# Patient Record
Sex: MALE | Race: WHITE | NOT HISPANIC OR LATINO | Employment: OTHER | URBAN - METROPOLITAN AREA
[De-identification: names, ages, dates, MRNs, and addresses within clinical notes are randomized per-mention and may not be internally consistent; named-entity substitution may affect disease eponyms.]

---

## 2017-01-23 ENCOUNTER — OFFICE VISIT (OUTPATIENT)
Dept: NEUROLOGY | Facility: CLINIC | Age: 82
End: 2017-01-23

## 2017-01-23 VITALS
HEIGHT: 67 IN | SYSTOLIC BLOOD PRESSURE: 117 MMHG | DIASTOLIC BLOOD PRESSURE: 58 MMHG | OXYGEN SATURATION: 98 % | WEIGHT: 169 LBS | HEART RATE: 70 BPM | BODY MASS INDEX: 26.53 KG/M2

## 2017-01-23 DIAGNOSIS — R51.9 CHRONIC INTRACTABLE HEADACHE, UNSPECIFIED HEADACHE TYPE: ICD-10-CM

## 2017-01-23 DIAGNOSIS — R41.3 MEMORY LOSS: ICD-10-CM

## 2017-01-23 DIAGNOSIS — G89.29 CHRONIC INTRACTABLE HEADACHE, UNSPECIFIED HEADACHE TYPE: ICD-10-CM

## 2017-01-23 DIAGNOSIS — G20 PARKINSON'S DISEASE (HCC): Primary | ICD-10-CM

## 2017-01-23 PROCEDURE — 99214 OFFICE O/P EST MOD 30 MIN: CPT | Performed by: NURSE PRACTITIONER

## 2017-01-23 RX ORDER — CLONIDINE HYDROCHLORIDE 0.2 MG/1
0.2 TABLET ORAL 2 TIMES DAILY
COMMUNITY
End: 2017-02-03

## 2017-01-23 RX ORDER — CLOPIDOGREL BISULFATE 75 MG/1
75 TABLET ORAL DAILY
COMMUNITY

## 2017-01-23 RX ORDER — GABAPENTIN 100 MG/1
300 CAPSULE ORAL 3 TIMES DAILY
Qty: 90 CAPSULE | Refills: 0 | Status: SHIPPED | OUTPATIENT
Start: 2017-01-23 | End: 2018-01-23

## 2017-01-23 NOTE — PROGRESS NOTES
Subjective:     Patient ID: Reid Pratt is a 89 y.o. male presenting for follow up for headaches, memory loss, and Parkinson's Disease. The patient was initialy seen by Dr. Laws on November 22, 2016. At htat time he was taking donepezil 5 mg daily for his memory loss. Dr. Laws had him increase the medication to 10 mg daily. His daughter is with him today and states he is tolearting the medication well. She feels his memory is about the same.     His headaches have worsened since his last visit. He was prescribed gabapentin 100 mg three times daily by Dr. Laws with instructions to titrate up to 300 mg three times daily if needed. The patient has not increased the dose and his daughter states they were unaware that they could do so. He is tolerating the medication well. He has complaints of pain and tenderness in the right occipital area at the skull base but no triggering of shooting pains to the crown.     He has continued to have frequent falls, about two per month. He has trouble with balance. He also has a tremor to his jaw and a pill rolling tremor to his left greater than right hand.     Headache    Associated symptoms include back pain, dizziness, hearing loss, neck pain and numbness (hands). Pertinent negatives include no abdominal pain, eye pain, nausea, photophobia, seizures, vomiting or weakness.   Parkinson's Disease   Associated symptoms include headaches, neck pain and numbness (hands). Pertinent negatives include no abdominal pain, chest pain, fatigue, nausea, rash, vomiting or weakness.     The following portions of the patient's history were reviewed and updated as appropriate: allergies, current medications, past family history, past medical history, past social history, past surgical history and problem list.    Review of Systems   Constitutional: Positive for activity change (eating less). Negative for appetite change and fatigue.   HENT: Positive for hearing loss. Negative for facial  swelling and trouble swallowing.    Eyes: Negative for photophobia, pain and visual disturbance.   Respiratory: Negative for choking, chest tightness and shortness of breath.    Cardiovascular: Negative for chest pain, palpitations and leg swelling.   Gastrointestinal: Negative for abdominal pain, nausea and vomiting.   Endocrine: Negative for polydipsia and polyphagia.   Genitourinary: Positive for difficulty urinating. Negative for frequency and urgency.   Musculoskeletal: Positive for back pain, gait problem and neck pain.   Skin: Negative for color change, rash and wound.   Allergic/Immunologic: Negative for environmental allergies, food allergies and immunocompromised state.   Neurological: Positive for dizziness, numbness (hands) and headaches. Negative for tremors, seizures, syncope, facial asymmetry, speech difficulty, weakness and light-headedness.   Hematological: Negative for adenopathy. Bruises/bleeds easily.   Psychiatric/Behavioral: Positive for confusion and decreased concentration. Negative for agitation, behavioral problems, dysphoric mood, hallucinations, self-injury, sleep disturbance and suicidal ideas. The patient is not nervous/anxious and is not hyperactive.         Objective:    Neurologic Exam  General appearance: Well-developed elderly white male no acute distress  HEENT: Normocephalic no evidence of trauma. Some tenderness over the right occipital ridge but no triggering of pain. Temporal arteries are prominent but not beaded and they are nontender. Throat negative.  Neck: Supple. No cervical bruits. No thyromegaly. Radial pulses were strong and simultaneous  Heart: Regular rate and rhythm     Neurologic:   Mental status: Awake alert and conversant. His Mini-Mental state score was only a 6/30 at his last visit, this was not repeated today, however he seems to function better than this  Higher Cortical Functions: No aphasia but some dyspraxias are noted.  Cranial nerves:   II: Visual fields  were full no left inattention   III,IV,VI: Eye movements are full with saccadic pursuit. Pupils are status post surgery   V,VII: Light touch and pinprick normal. Face symmetric   VIII: Reduced  IX,X: Intact  XI: Intact  XII: Midline   Motor: Normal tone. Normal bulk. Power was grossly intact diffusely. Jaw tremor present along with left greater than right pill-rolling tremors.  Reflexes: Symmetric  Sensory: Light touch and pinprick diffusely intact. Vibration reduced at the ankles. Position intact in the great toes  Cerebellar: Finger to nose rapid movements heel-to-shin were intact. No resting tremor left hand greater than right  Gait and Station: Comes to stand slowly. Broad-based gait, mild shuffling today.    Physical Exam    Assessment/Plan:     Reid was seen today for headache and parkinson's disease.    Diagnoses and all orders for this visit:    Parkinson's disease    Chronic intractable headache, unspecified headache type  -     gabapentin (NEURONTIN) 100 MG capsule; Take 3 capsules by mouth 3 (Three) Times a Day.    Memory loss       1. Memory loss: He is tolerating donepezil 10 mg daily well. I will have him continue this. Side effects reviewed. Will consider adding Namenda in the future as other problems are treated. Will hold off for now given changes made on other medications.   2. Headaches: Increase gabapentin to 200 mg three times daily for 1 week and then 300 mg three times daily. This was reviewed with his daughter and written down for her. If his headaches don't improve within 4 weeks she is to call and let me know. It could be escalated as much as 600 mg three times a day.   3. I discussed Parkinson's Disease with the patient and his daughter today. We will consider a trial of Sinemet at his next visit, but will hold off for now while titrating gabapentin.     Return in 6 weeks.

## 2017-01-25 RX ORDER — ESCITALOPRAM OXALATE 10 MG/1
TABLET ORAL
Qty: 90 TABLET | Refills: 0 | Status: SHIPPED | OUTPATIENT
Start: 2017-01-25

## 2017-01-25 RX ORDER — NISOLDIPINE 25.5 MG/1
TABLET, FILM COATED, EXTENDED RELEASE ORAL
Qty: 90 TABLET | Refills: 0 | Status: SHIPPED | OUTPATIENT
Start: 2017-01-25 | End: 2017-02-03

## 2017-01-25 RX ORDER — METOPROLOL SUCCINATE 50 MG/1
TABLET, EXTENDED RELEASE ORAL
Qty: 90 TABLET | Refills: 0 | Status: SHIPPED | OUTPATIENT
Start: 2017-01-25

## 2017-01-25 RX ORDER — ALLOPURINOL 100 MG/1
TABLET ORAL
Qty: 90 TABLET | Refills: 0 | Status: SHIPPED | OUTPATIENT
Start: 2017-01-25

## 2017-01-26 RX ORDER — ATORVASTATIN CALCIUM 10 MG/1
TABLET, FILM COATED ORAL
Qty: 90 TABLET | Refills: 0 | Status: SHIPPED | OUTPATIENT
Start: 2017-01-26

## 2017-02-03 ENCOUNTER — OFFICE VISIT (OUTPATIENT)
Dept: INTERNAL MEDICINE | Facility: CLINIC | Age: 82
End: 2017-02-03

## 2017-02-03 VITALS
BODY MASS INDEX: 26.21 KG/M2 | WEIGHT: 167 LBS | DIASTOLIC BLOOD PRESSURE: 57 MMHG | SYSTOLIC BLOOD PRESSURE: 120 MMHG | TEMPERATURE: 96.6 F | HEIGHT: 67 IN | HEART RATE: 55 BPM | OXYGEN SATURATION: 90 %

## 2017-02-03 DIAGNOSIS — I50.42 CHRONIC COMBINED SYSTOLIC AND DIASTOLIC HEART FAILURE (HCC): ICD-10-CM

## 2017-02-03 DIAGNOSIS — H61.23 IMPACTED CERUMEN OF BOTH EARS: ICD-10-CM

## 2017-02-03 DIAGNOSIS — G90.3 SHY-DRAGER SYNDROME (HCC): ICD-10-CM

## 2017-02-03 DIAGNOSIS — G20 PARKINSON'S DISEASE (HCC): Primary | ICD-10-CM

## 2017-02-03 DIAGNOSIS — I71.40 ABDOMINAL AORTIC ANEURYSM WITHOUT RUPTURE (HCC): ICD-10-CM

## 2017-02-03 DIAGNOSIS — E78.49 OTHER HYPERLIPIDEMIA: ICD-10-CM

## 2017-02-03 DIAGNOSIS — I10 ESSENTIAL HYPERTENSION: ICD-10-CM

## 2017-02-03 DIAGNOSIS — H91.93 BILATERAL HEARING LOSS: ICD-10-CM

## 2017-02-03 PROCEDURE — 99214 OFFICE O/P EST MOD 30 MIN: CPT | Performed by: INTERNAL MEDICINE

## 2017-02-03 RX ORDER — NISOLDIPINE 17 MG/1
17 TABLET, FILM COATED, EXTENDED RELEASE ORAL DAILY
Qty: 90 TABLET | Refills: 1 | Status: SHIPPED | OUTPATIENT
Start: 2017-02-03

## 2017-02-12 NOTE — PROGRESS NOTES
Subjective   Reid Pratt is a 89 y.o. male.   He is here to follow-up for Parkinson's disease with what appears to be Shy-Drager syndrome hyper lipidemia hypertension AAA without rupture chronic combined systolic and diastolic heart failure bilateral hearing loss and impacted cerumen of both ears  History of Present Illness   He is here today follow-up for Parkinson's disease with what appears to be Shy-Drager syndrome along with hyper lipidemia hypertension AAA without rupture chronic combined systolic and diastolic heart failure bilateral hearing loss and impacted cerumen of both ears  The following portions of the patient's history were reviewed and updated as appropriate: allergies, current medications, past family history, past medical history, past social history, past surgical history and problem list.    Review of Systems   Neurological: Positive for weakness and light-headedness.   All other systems reviewed and are negative.      Objective   Physical Exam   Constitutional: He is oriented to person, place, and time. He appears well-developed and well-nourished. He is cooperative.   HENT:   Head: Normocephalic and atraumatic.   Right Ear: Hearing, external ear and ear canal normal. There is drainage (impacted cerumen).   Left Ear: Hearing, external ear and ear canal normal. There is drainage ( impacted cerumen).   Nose: Nose normal.   Mouth/Throat: Oropharynx is clear and moist.   Eyes: Conjunctivae, EOM and lids are normal. Pupils are equal, round, and reactive to light.   Neck: Phonation normal. Neck supple. Carotid bruit is not present.   Cardiovascular: Normal rate, regular rhythm and normal heart sounds.  Exam reveals no gallop and no friction rub.    No murmur heard.  Pulmonary/Chest: Effort normal and breath sounds normal. No respiratory distress.   Abdominal: Soft. Bowel sounds are normal. He exhibits no distension and no mass. There is no hepatosplenomegaly. There is no tenderness. There is no  rebound and no guarding. No hernia.   Musculoskeletal: He exhibits no edema.   Neurological: He is alert and oriented to person, place, and time. He has normal strength. Coordination and gait abnormal.   Skin: Skin is warm and dry.   Psychiatric: He has a normal mood and affect. His speech is normal and behavior is normal. Judgment and thought content normal.   Nursing note and vitals reviewed.      Assessment/Plan   Diagnoses and all orders for this visit:    Parkinson's disease    Shy-Drager syndrome    Other hyperlipidemia    Essential hypertension    Abdominal aortic aneurysm without rupture    Chronic combined systolic and diastolic heart failure    Bilateral hearing loss    Impacted cerumen of both ears  -     Ambulatory Referral to ENT (Otolaryngology)    Other orders  -     nisoldipine (SULAR) 17 MG 24 hr tablet; Take 1 tablet by mouth Daily.      Parkinson's disease supportive meds  Shy-Drager syndrome refer back to neurology  Hyperlipidemia keep LDL less than 70 with proper diet exercise medication  Hypertension well-controlled on current medication  Chronic combined systolic and diastolic heart failure stable  Bilateral hearing loss hearing aids and  Impacted cerumen of both ears referral to ENT for cleanout  We will also cut back on his blood pressure medicine as well

## 2020-03-17 NOTE — MR AVS SNAPSHOT
Reid Pratt   1/23/2017 10:30 AM   Office Visit    Dept Phone:  [phone]   Encounter #:  [id number]    Provider:  REJI Marte   Department:  [facility] NEUROLOGY                Your Full Care Plan              Today's Medication Changes          These changes are accurate as of: 1/23/17 11:11 AM.  If you have any questions, ask your nurse or doctor.               Medication(s)that have changed:     gabapentin 100 MG capsule   Commonly known as:  NEURONTIN   Take 3 capsules by mouth 3 (Three) Times a Day.   What changed:  how much to take   Changed by:  REJI Marte            Where to Get Your Medications      These medications were sent to [facility] mail order [id number] - [place], [place] - [address] AT [facility] mail services - [phone]  - [phone]   [address] [address]     Phone:  [phone]     gabapentin 100 MG capsule                  Your Updated Medication List          This list is accurate as of: 1/23/17 11:11 AM.  Always use your most recent med list.                allopurinol 100 MG tablet   Commonly known as:  ZYLOPRIM   TAKE ONE TABLET BY MOUTH ONCE DAILY       ALPRAZolam 0.25 MG tablet   Commonly known as:  XANAX       aspirin 81 MG tablet       atorvastatin 10 MG tablet   Commonly known as:  LIPITOR   TAKE ONE TABLET BY MOUTH ONCE DAILY       CloNIDine 0.2 MG tablet   Commonly known as:  CATAPRES       clopidogrel 75 MG tablet   Commonly known as:  PLAVIX       donepezil 5 MG tablet   Commonly known as:  ARICEPT   Take 2 tablets by mouth Every Night.       escitalopram 10 MG tablet   Commonly known as:  LEXAPRO   TAKE ONE TABLET BY MOUTH ONCE DAILY       gabapentin 100 MG capsule   Commonly known as:  NEURONTIN   Take 3 capsules by mouth 3 (Three) Times a Day.       isosorbide mononitrate 60 MG 24 hr tablet   Commonly known as:  IMDUR   TAKE ONE TABLET BY MOUTH ONCE DAILY       metoprolol succinate XL 50 MG 24 hr tablet   Commonly known as:  TOPROL-XL   TAKE ONE TABLET BY MOUTH ONCE DAILY       nisoldipine 25.5 MG 24 hr tablet   Commonly known as:  SULAR   TAKE ONE TABLET BY MOUTH ONCE DAILY       nitroglycerin 0.4 MG SL tablet   Commonly known as:  NITROSTAT               You Were Diagnosed With        Codes Comments    Chronic intractable headache, unspecified headache type     ICD-10-CM: R51  ICD-9-CM: 784.0       Instructions     None    Patient Instructions History      Upcoming Appointments     Visit Type Date Time Department    FOLLOW UP 2017 10:30 AM [facility]    OFFICE VISIT 2/3/2017 10:30 AM [facility] [id number]    FOLLOW UP 3/6/2017 12:30 PM [facility]      Perfect Signup     [facility] Perfect allows you to send messages to your doctor, view your test results, renew your prescriptions, schedule appointments, and more. To sign up, go to Infectious and click on the Sign Up Now link in the New User? box. Enter your Perfect Activation Code exactly as it appears below along with the last four digits of your Social Security Number and your Date of Birth () to complete the sign-up process. If you do not sign up before the expiration date, you must request a new code.    Perfect Activation Code: [email or web address]  Expires: 2017 11:10 AM    If you have questions, you can email [email or web address] or call [phone] to talk to our Perfect staff. Remember, Perfect is NOT to be used for urgent needs. For medical emergencies, dial 911.               Other Info from Your Visit           Your Appointments     2017 10:30 AM EST   Office Visit with Ezio Narayan MD   [facility] INTERNAL MEDICINE (--)    [address] [facility] [address]  [facility] [address] [phone]           Arrive 15 minutes prior to appointment.            Mar 06, 2017 12:30 PM EST   Follow Up with REJI Marte  "  [facility] NEUROLOGY (--)    [address]  [facility] [address] [phone]           Arrive 15 minutes prior to appointment.              Allergies     Tramadol        Reason for Visit     Headache     Parkinson's Disease           Vital Signs     Blood Pressure Pulse Height Weight Oxygen Saturation Body Mass Index    117/58 70 67\" (170.2 cm) 169 lb (76.7 kg) 98% 26.47 kg/m2    Smoking Status                   Never Smoker           Problems and Diagnoses Noted     Chronic intractable headache, unspecified headache type            " Detail Level: Detailed General Sunscreen Counseling: I recommended a broad spectrum sunscreen with a SPF of 30 or higher.  I explained that SPF 30 sunscreens block approximately 97 percent of the sun's harmful rays.  Sunscreens should be applied at least 15 minutes prior to expected sun exposure and then every 2 hours after that as long as sun exposure continues. If swimming or exercising sunscreen should be reapplied every 45 minutes to an hour after getting wet or sweating.  One ounce, or the equivalent of a shot glass full of sunscreen, is adequate to protect the skin not covered by a bathing suit. I also recommended a lip balm with a sunscreen as well. Sun protective clothing can be used in lieu of sunscreen but must be worn the entire time you are exposed to the sun's rays.